# Patient Record
Sex: MALE | Race: WHITE
[De-identification: names, ages, dates, MRNs, and addresses within clinical notes are randomized per-mention and may not be internally consistent; named-entity substitution may affect disease eponyms.]

---

## 2020-12-29 PROBLEM — Z00.00 ENCOUNTER FOR PREVENTIVE HEALTH EXAMINATION: Status: ACTIVE | Noted: 2020-12-29

## 2020-12-30 ENCOUNTER — APPOINTMENT (OUTPATIENT)
Dept: ENDOCRINOLOGY | Facility: CLINIC | Age: 20
End: 2020-12-30
Payer: COMMERCIAL

## 2020-12-30 VITALS
DIASTOLIC BLOOD PRESSURE: 70 MMHG | SYSTOLIC BLOOD PRESSURE: 122 MMHG | HEIGHT: 70 IN | HEART RATE: 91 BPM | BODY MASS INDEX: 20.04 KG/M2 | WEIGHT: 140 LBS

## 2020-12-30 DIAGNOSIS — M62.89 OTHER SPECIFIED DISORDERS OF MUSCLE: ICD-10-CM

## 2020-12-30 DIAGNOSIS — R63.1 POLYDIPSIA: ICD-10-CM

## 2020-12-30 DIAGNOSIS — Z78.9 OTHER SPECIFIED HEALTH STATUS: ICD-10-CM

## 2020-12-30 DIAGNOSIS — L65.9 NONSCARRING HAIR LOSS, UNSPECIFIED: ICD-10-CM

## 2020-12-30 DIAGNOSIS — R00.2 PALPITATIONS: ICD-10-CM

## 2020-12-30 PROCEDURE — 99072 ADDL SUPL MATRL&STAF TM PHE: CPT

## 2020-12-30 PROCEDURE — 36415 COLL VENOUS BLD VENIPUNCTURE: CPT

## 2020-12-30 PROCEDURE — 99204 OFFICE O/P NEW MOD 45 MIN: CPT | Mod: 25

## 2021-01-05 ENCOUNTER — NON-APPOINTMENT (OUTPATIENT)
Age: 21
End: 2021-01-05

## 2021-01-07 ENCOUNTER — NON-APPOINTMENT (OUTPATIENT)
Age: 21
End: 2021-01-07

## 2021-02-03 ENCOUNTER — APPOINTMENT (OUTPATIENT)
Dept: ENDOCRINOLOGY | Facility: CLINIC | Age: 21
End: 2021-02-03
Payer: COMMERCIAL

## 2021-02-03 ENCOUNTER — APPOINTMENT (OUTPATIENT)
Dept: ENDOCRINOLOGY | Facility: CLINIC | Age: 21
End: 2021-02-03

## 2021-02-03 DIAGNOSIS — R35.8 XXOTHER POLYURIA: ICD-10-CM

## 2021-02-03 PROCEDURE — 99442: CPT

## 2021-02-03 NOTE — HISTORY OF PRESENT ILLNESS
[FreeTextEntry1] : Demian is a 20 yr old male,for follow up.\par \par This visit was provided via telehealth visit, patient was located at her home.\par  I was located  in  my office  at [facility].\par verbal  consent was given by patient, and patient agreed/requested  to the telehealth visit.\par It was a phone visit.\par \par  He has h/o autism, learning disability, .\par patient says he is here for stunted growth, but his height is fine, he reports normal penile size, normal pubertal changes.\par His Urologist did some testing showed  normal testosterone in 9/2020.\par Per mother , he insists to see physician, she  does not have much concerns, she does not think he has any  growth issues,\par  but says his sister has diabetes and he has some polyuria and polydipsia.\par Does complains of hair loss, but visually he has full head of thick hair.\par Facial hair growth is ok.Gets morning erections.\par He also report polyuria and polydipsia, but drinks not because he  is  thirsty, says he thinks he needs to drink a lot.\par Energy is fine, but he is skinny, he has always been skinny.\par Denies heat or cold intolerance, no weight changes, no constipation or diarrhea, occasional palpitations, energy level fair, no skin or hair changes.No new headaches, no visual changes, no LOC.\par Last visit we did some testing, a1c, thyroid functions, antibodies, testosterone levels, all  came back normal.\par He still says he is urinating a lot at night, wakes up 2 to 3 times at night.\par \par \par \par

## 2021-02-03 NOTE — ASSESSMENT
[FreeTextEntry1] : Demian is a 20 yr old male,for follow up.\par \par This visit was provided via telehealth visit, patient was located at her home.\par  I was located  in  my office  at [facility].\par verbal  consent was given by patient, and patient agreed/requested  to the telehealth visit.\par It was a phone visit.\par \par Demian is a 20 yr old male, here for vague issues. He has h/o autism, learning disability.\par patient says he is here for stunted growth, but his height is fine, he reports normal penile size, normal pubertal changes.\par His Urologist did some testing, we reviewed that blood work ,  showed  normal testosterone in 9/2020.\par We will also get blood work done by his PCP and dermatologist.\par Complains of polyuria, polydipsia, hair loss, occasional palpitations.\par Last visit we did some testing, a1c, thyroid functions, antibodies, testosterone levels, all  came back normal.\par Discussed results with him in detail, last visit he was concerned if he had diabetes, discussed with him that his a1c was completely normal\par He still says he is urinating a lot at night, wakes up 2 to 3 times at night.\par Recommended to hold off water for as long as he can and then get BMP, will check  for sodium levels to rule out DI.\par \par \par 12, minutes spent on phone with patient.\par \par RTC after tests are done

## 2021-02-03 NOTE — REVIEW OF SYSTEMS
[Palpitations] : palpitations [Polyuria] : polyuria [Back Pain] : back pain [Hair Loss] : hair loss [Polydipsia] : polydipsia [Fatigue] : no fatigue [Decreased Appetite] : appetite not decreased [Recent Weight Gain (___ Lbs)] : no recent weight gain [Recent Weight Loss (___ Lbs)] : no recent weight loss [Visual Field Defect] : no visual field defect [Dry Eyes] : no dryness [Eye Pain] : no pain [Dysphagia] : no dysphagia [Neck Pain] : no neck pain [Dysphonia] : no dysphonia [Chest Pain] : no chest pain [Lower Ext Edema] : no lower extremity edema [Shortness Of Breath] : no shortness of breath [Cough] : no cough [Orthopnea] : no orthopnea [Nausea] : no nausea [Constipation] : no constipation [Abdominal Pain] : no abdominal pain [Vomiting] : no vomiting [Diarrhea] : no diarrhea [Dysuria] : no dysuria [Joint Pain] : no joint pain [Muscle Weakness] : no muscle weakness [Myalgia] : no myalgia  [Acanthosis] : no acanthosis  [Acne] : no acne [Dry Skin] : no dry skin [Headaches] : no headaches [Dizziness] : no dizziness [Tremors] : no tremors [Depression] : no depression [Insomnia] : no insomnia [Anxiety] : no anxiety [Cold Intolerance] : no cold intolerance [Heat Intolerance] : no heat intolerance [Easy Bleeding] : no ~M tendency for easy bleeding [Easy Bruising] : no tendency for easy bruising

## 2021-02-16 ENCOUNTER — NON-APPOINTMENT (OUTPATIENT)
Age: 21
End: 2021-02-16

## 2022-12-06 ENCOUNTER — RESULT REVIEW (OUTPATIENT)
Age: 22
End: 2022-12-06

## 2024-07-22 ENCOUNTER — APPOINTMENT (OUTPATIENT)
Dept: OPHTHALMOLOGY | Facility: CLINIC | Age: 24
End: 2024-07-22
Payer: COMMERCIAL

## 2024-07-22 ENCOUNTER — NON-APPOINTMENT (OUTPATIENT)
Age: 24
End: 2024-07-22

## 2024-07-22 PROCEDURE — 92133 CPTRZD OPH DX IMG PST SGM ON: CPT

## 2024-07-22 PROCEDURE — 92004 COMPRE OPH EXAM NEW PT 1/>: CPT
